# Patient Record
Sex: MALE | Race: WHITE
[De-identification: names, ages, dates, MRNs, and addresses within clinical notes are randomized per-mention and may not be internally consistent; named-entity substitution may affect disease eponyms.]

---

## 2018-09-07 ENCOUNTER — HOSPITAL ENCOUNTER (EMERGENCY)
Dept: HOSPITAL 74 - JERFT | Age: 58
Discharge: HOME | End: 2018-09-07
Payer: COMMERCIAL

## 2018-09-07 VITALS — SYSTOLIC BLOOD PRESSURE: 154 MMHG | TEMPERATURE: 98.6 F | DIASTOLIC BLOOD PRESSURE: 98 MMHG | HEART RATE: 89 BPM

## 2018-09-07 VITALS — BODY MASS INDEX: 34 KG/M2

## 2018-09-07 DIAGNOSIS — K21.9: ICD-10-CM

## 2018-09-07 DIAGNOSIS — M79.642: Primary | ICD-10-CM

## 2018-09-07 DIAGNOSIS — S69.82XA: ICD-10-CM

## 2018-09-07 DIAGNOSIS — N40.0: ICD-10-CM

## 2018-09-07 PROCEDURE — 3E0233Z INTRODUCTION OF ANTI-INFLAMMATORY INTO MUSCLE, PERCUTANEOUS APPROACH: ICD-10-PCS

## 2018-09-07 NOTE — PDOC
Rapid Medical Evaluation


Chief Complaint: Pain, Acute


Time Seen by Provider: 09/07/18 18:23


Medical Evaluation: 


 Allergies











Allergy/AdvReac Type Severity Reaction Status Date / Time


 


No Known Allergies Allergy   Verified 08/14/14 18:58











09/07/18 18:26


58 year old male with left index finger pain unsure of injury. limited rom


PE: patient alert ox3. pain to DIP joint





A; finger pain





p" xray





patient to the ER for further management of care. 





**Discharge Disposition





- Diagnosis


 Finger pain, left








- Referrals





- Patient Instructions





- Post Discharge Activity

## 2018-09-07 NOTE — PDOC
History of Present Illness





- General


Chief Complaint: Pain, Acute


Stated Complaint: HAND PAIN


Time Seen by Provider: 09/07/18 18:23


History Source: Patient





- History of Present Illness


Occurred: reports: other


Upper Extremity Pain Location: left: 2nd finger, hand





Past History





- Past Medical History


Allergies/Adverse Reactions: 


 Allergies











Allergy/AdvReac Type Severity Reaction Status Date / Time


 


No Known Allergies Allergy   Verified 09/07/18 18:25











Home Medications: 


Ambulatory Orders





Oxycodone HCl/Acetaminophen [Percocet 5-325 mg Tablet -] 1 - 2 tab PO Q6H #20 

tablet 07/21/14 


Clindamycin [Cleocin -] 300 mg PO TID #30 capsule 08/14/14 


Ibuprofen [Motrin] 800 mg PO TID #20 tablet 08/14/14 


Methylprednisolone [Medrol Dose Curtis] 4 mg PO ASDIR #21 tablet 08/14/14 


Oxycodone HCl/Acetaminophen [Percocet 5-325 mg Tablet] 1 - 2 tab PO Q6H #20 

tablet 08/14/14 








COPD: No


DVT: No


Dementia: No


Other medical history: GERD & BPH





- Immunization History


Immunization Up to Date: No





- Suicide/Smoking/Psychosocial Hx


Smoking History: Never smoked


Have you smoked in the past 12 months: No


Information on smoking cessation initiated: No


Hx Alcohol Use: No


Drug/Substance Use Hx: No


Substance Use Type: None





**Review of Systems





- Review of Systems


Constitutional: No: Chills, Fever


Musculoskeletal: Yes: Joint Pain.  No: Joint Swelling





*Physical Exam





- Vital Signs


 Last Vital Signs











Temp Pulse Resp BP Pulse Ox


 


 98.6 F   89   16   154/98   99 


 


 09/07/18 18:25  09/07/18 18:25  09/07/18 18:25  09/07/18 18:25  09/07/18 18:25














- Physical Exam


General Appearance: Yes: Appropriately Dressed.  No: Apparent Distress


HEENT: positive: Normal Voice


Neck: positive: Supple


Respiratory/Chest: negative: Respiratory Distress


Extremity: positive: Tender (to PIP and MCP J of L 2nd digit without swelling, 

deformity or skin changes, FROMI, able to make fist, NVI)


Integumentary: positive: Dry, Warm


Neurologic: positive: Fully Oriented, Alert, Normal Mood/Affect





Medical Decision Making





- Medical Decision Making





09/07/18 18:55


58-year-old male, history of GERD, BPH, here with left hand pain.  Patient 

states for the past 2 days has had severe pain to left 2nd finger diffusely.  

Taking Tylenol with no relief.  Denies any trauma or other obvious inciting 

factors.  Patient is left-handed and does heavy lifting at work.  Patient 

states he also has intermittent pain to left hand when he is pushing himself up 

from a sitting position.  Also states for the past several years, he has had 

intermittent numbness to left hand that usually goes away when he shakes hand 

per patient. No wrist pain or tingling





See exam





Possible arthritis vs overuse injury to L hand


No trauma


No e/o gout or infection at this time


Hx not c/w carpel tunnel


-pain control in ED


-dc w/ motrin prn and pmd f/u for further evaluation


09/07/18 19:00








*DC/Admit/Observation/Transfer


Diagnosis at time of Disposition: 


 Hand pain, left








- Discharge Dispostion


Disposition: HOME


Condition at time of disposition: Good





- Referrals





- Patient Instructions


Printed Discharge Instructions:  DI for Hand Pain


Additional Instructions: 


The cause of your left hand pain is unclear at this time.  


 Possibilities include arthritis or overuse injury as discussed in ED.


Take Motrin as needed for pain


Follow-up with your doctor for further evaluation and possible referral to a 

rheumatologist





- Post Discharge Activity

## 2020-10-16 ENCOUNTER — HOSPITAL ENCOUNTER (EMERGENCY)
Dept: HOSPITAL 74 - JERFT | Age: 60
Discharge: HOME | End: 2020-10-16
Payer: COMMERCIAL

## 2020-10-16 VITALS — TEMPERATURE: 98.5 F | DIASTOLIC BLOOD PRESSURE: 87 MMHG | HEART RATE: 79 BPM | SYSTOLIC BLOOD PRESSURE: 162 MMHG

## 2020-10-16 VITALS — BODY MASS INDEX: 34 KG/M2

## 2020-10-16 DIAGNOSIS — M54.2: Primary | ICD-10-CM

## 2020-10-16 LAB
ALBUMIN SERPL-MCNC: 3.8 G/DL (ref 3.4–5)
ALP SERPL-CCNC: 73 U/L (ref 45–117)
ALT SERPL-CCNC: 39 U/L (ref 13–61)
ANION GAP SERPL CALC-SCNC: 6 MMOL/L (ref 8–16)
AST SERPL-CCNC: 30 U/L (ref 15–37)
BASOPHILS # BLD: 0.5 % (ref 0–2)
BILIRUB SERPL-MCNC: 0.4 MG/DL (ref 0.2–1)
BUN SERPL-MCNC: 22.1 MG/DL (ref 7–18)
CALCIUM SERPL-MCNC: 9.1 MG/DL (ref 8.5–10.1)
CHLORIDE SERPL-SCNC: 110 MMOL/L (ref 98–107)
CO2 SERPL-SCNC: 24 MMOL/L (ref 21–32)
CREAT SERPL-MCNC: 0.9 MG/DL (ref 0.55–1.3)
DEPRECATED RDW RBC AUTO: 13 % (ref 11.9–15.9)
EOSINOPHIL # BLD: 2 % (ref 0–4.5)
GLUCOSE SERPL-MCNC: 112 MG/DL (ref 74–106)
HCT VFR BLD CALC: 41.4 % (ref 35.4–49)
HGB BLD-MCNC: 14.3 GM/DL (ref 11.7–16.9)
LYMPHOCYTES # BLD: 28.2 % (ref 8–40)
MCH RBC QN AUTO: 30.9 PG (ref 25.7–33.7)
MCHC RBC AUTO-ENTMCNC: 34.5 G/DL (ref 32–35.9)
MCV RBC: 89.5 FL (ref 80–96)
MONOCYTES # BLD AUTO: 10.3 % (ref 3.8–10.2)
NEUTROPHILS # BLD: 59 % (ref 42.8–82.8)
PLATELET # BLD AUTO: 173 K/MM3 (ref 134–434)
PMV BLD: 8.7 FL (ref 7.5–11.1)
POTASSIUM SERPLBLD-SCNC: 4.1 MMOL/L (ref 3.5–5.1)
PROT SERPL-MCNC: 7.5 G/DL (ref 6.4–8.2)
RBC # BLD AUTO: 4.62 M/MM3 (ref 4–5.6)
SODIUM SERPL-SCNC: 140 MMOL/L (ref 136–145)
WBC # BLD AUTO: 5.5 K/MM3 (ref 4–10)

## 2020-10-16 PROCEDURE — C9803 HOPD COVID-19 SPEC COLLECT: HCPCS

## 2020-10-16 PROCEDURE — 3E0233Z INTRODUCTION OF ANTI-INFLAMMATORY INTO MUSCLE, PERCUTANEOUS APPROACH: ICD-10-PCS

## 2020-10-16 PROCEDURE — U0003 INFECTIOUS AGENT DETECTION BY NUCLEIC ACID (DNA OR RNA); SEVERE ACUTE RESPIRATORY SYNDROME CORONAVIRUS 2 (SARS-COV-2) (CORONAVIRUS DISEASE [COVID-19]), AMPLIFIED PROBE TECHNIQUE, MAKING USE OF HIGH THROUGHPUT TECHNOLOGIES AS DESCRIBED BY CMS-2020-01-R: HCPCS

## 2020-10-16 NOTE — XMS
Summarization Of Episode

                           Created on:2020



Patient:WILMER GOSS

Sex:Male

:1960

External Reference #:[id number]





Demographics







                          Address                   [address]



                                                    [address]



                                                    [place] [address]

 

                          Home Phone                [phone]

 

                          Work Phone                [phone]

 

                          Preferred Language        English

 

                          Marital Status             or 

 

                          Islam Affiliation     [place]

 

                          Race                      [facility]

 

                          Ethnic Group               or 









Author







                          Organization              [facility]









Support







                Name            Relationship    Address         Phone

 

                TYE Fernandez     [address] [phone]



                                                [place] [address]    

 

                PIERCE           PARTNER         [address]    [phone]



                                                [place] [address] 









Re-disclosure Warning

The records that you are about to access may contain information from federally-
assisted alcohol or drug abuse programs. If such information is present, then 
the following federally mandated warning applies: This information has been 
disclosed to you from records protected by federal confidentiality rules (42 CFR
part 2). The federal rules prohibit you from making any further disclosure of 
this information unless further disclosure is expressly permitted by the written
consent of the person to whom it pertains or as otherwise permitted by 42 CFR 
part 2. A general authorization for the release of medical or other information 
is NOT sufficient for this purpose. The Federal rules restrict any use of the 
information to criminally investigate or prosecute any alcohol or drug abuse 
patient.The records that you are about to access may contain highly sensitive 
health information, the redisclosure of which is protected by Article 27-F of 
the [facility] Public Health law. If you continue you may haveaccess to 
information: Regarding HIV / AIDS; Provided by facilities licensed or operated 
by the [facility] Office of Mental Health; or Provided by the [facility]
Office for People With Developmental Disabilities. If such information is 
present, then the following [place] mandated warning applies: This 
information has been disclosed to you from confidential records which are 
protected by state law. State law prohibits you from making any further 
disclosure of this information without the specific written consent of the 
person to whom it pertains, or as otherwise permitted by law. Any unauthorized 
further disclosure in violation of state law may result in a fine or retirement 
sentence or both. A general authorization for the release of medical or other 
information is NOT sufficient authorization for further disclosure.



Family History







           Family Member Family Member Family Member Date of    Description Data

 Source(s)



           Name       Gender     Status     Status                

 

           Unknown    Male       Diagnosis  2011            [facility]



                                            12:00:00 AM            Jeffy Houser

al



                                            [facility]T                   [facility])







Insurance Providers







          Payer name Policy type Policy ID Covered   Covered party's Policy    P

albaro



                    / Coverage           party ID  relationship to Anthony    Inf

ormation



                    type                          anthony              

 

          UNHC MEDICAID           [id number]           SP                  [id number]

[id number]



          COMM PLAN                                                   

 

          MVP Medicaid           [id number]           S                   [id number]

01



          Managed Care                                                   

 

          Dental              [id number]           S                   [id number]



          [facility]D                                                   

 

          [facility]            [id number]           S                   [id number]



          [employer] MKD                                                   

 

          [facility]           [id number]           S                   [id number]

1



          FFS Medicaid                                                   

 

          Medicaid [id number]           [id number]            S                   [id number]

[id number]



          Wrap Claims                                                   

 

          [facility]           [id number]           S                   [id number]

1



          Options MKD                                                   

 

          (DON'T USE)           [id number]           S                   [id number]

1



          Dental                                                      



          [facility]                                                   



          Non-Par                                                     



          Medical Manage                                                   



          [address]                                                     

 

          (DO NOT USE)           [id number]           S                   [id number]

01



          [facility]                                                   



          Auth PCP Not                                                   



          [facility]                                                   







Results







                    ID                  Date                Data Source

 

                    [id number]           2020 12:00:00 AM EDT [facility]











          Name      Value     Range     Interpretation Code Description Data Yue

rce(s) Supporting



                                                                      Document(s

)

 

          2019-nCoV                                         [facility]    



          RNA XXX                                                     



          QUEENIE+probe-                                                   



          Imp                                                         









                                        This lab was ordered by [facility] and reported by [employer]











                                        Procedure

## 2020-10-16 NOTE — PDOC
History of Present Illness





- General


Chief Complaint: Pain


Stated Complaint: NECK PAIN


Time Seen by Provider: 10/16/20 12:37


History Source: Patient


Exam Limitations: No Limitations





- History of Present Illness


Initial Comments: 





10/16/20 13:15


60-year-old male history of gastritis, BPH presents complaining of left-sided 

neck pain after heavy lifting at work 1 week ago.  Yesterday reports feeling 

right-sided throat pain, left-sided temporal discomfort, "I just don't feel 

right". Denies changes in vision, fever, chills, rash, headache, vomiting, 

Recent travel, known sick contacts, abdominal pain, chest pain, shortness of 

breath, weakness, numbness, tingling or any other complaints.  Patient has been 

taking acetaminophen daily with some relief.  States today neck pain has 

improved greatly and sore throat is resolved. 





ROS: as above





PE:


GENERAL: well-appearing, NAD


HEAD: NCAT


EYES: EOMI, Pupils equal, round and reactive to light, sclera anicteric, 

conjunctiva clear


ENT: Normal bilateral ear canals, pharynx: no erythema, no exudate, uvula 

midline


NECK: supple, no lymphadenopathy


CHEST: nontender


RESP: clear, no w/r/r


CARDIO: rrr, no m/g/r


ABD: +BS, soft, nontender, non distended


BACK: no midline spinal ttp, no CVAT 


EXTREMITIES: Normal range of motion, no edema


NEUROLOGICAL: Normal speech, normal gait


SKIN: No rashes noted, warm, Dry


Is this a multiple visit Asthma Patient?: No





Past History





- Medical History


Allergies/Adverse Reactions: 


                                    Allergies











Allergy/AdvReac Type Severity Reaction Status Date / Time


 


No Known Allergies Allergy   Verified 10/16/20 12:10











Home Medications: 


Ambulatory Orders





Oxycodone HCl/Acetaminophen [Percocet 5-325 mg Tablet -] 1 - 2 tab PO Q6H #20 

tablet 07/21/14 


Clindamycin [Cleocin -] 300 mg PO TID #30 capsule 08/14/14 


Ibuprofen [Motrin] 800 mg PO TID #20 tablet 08/14/14 


Methylprednisolone [Medrol Dose Curtis] 4 mg PO ASDIR #21 tablet 08/14/14 


Oxycodone HCl/Acetaminophen [Percocet 5-325 mg Tablet] 1 - 2 tab PO Q6H #20 

tablet 08/14/14 








COPD: No


DVT: No


Dementia: No





- Immunization History


Immunization Up to Date: No





- Psycho-Social/Smoking History


Smoking History: Never smoked


Have you smoked in the past 12 months: No





- Substance Abuse Hx (Audit-C & DAST Scrn)


How often the patient has a drink containing alcohol: Never


Score: In Men: 4 or > Positive; In Women: 3 or > Positive: 0


Screen Result (Pos requires Nsg. Audit-10AR): Negative


In the last yr the pt used illegal drug/Rx for NonMed reason: No


Score:  Yes response is considered Positive: 0


Screen Result (Positive result requires Nsg. DAST-10): Negative





*Physical Exam





- Vital Signs


                                Last Vital Signs











Temp Pulse Resp BP Pulse Ox


 


 98.5 F   79   18   162/87   97 


 


 10/16/20 12:11  10/16/20 12:11  10/16/20 12:11  10/16/20 12:11  10/16/20 12:11














ED Treatment Course





- LABORATORY


CBC & Chemistry Diagram: 


                                 10/16/20 13:05





                                 10/16/20 13:05





Medical Decision Making





- Medical Decision Making





10/16/20 13:33


60-year-old male history of gastritis, BPH presents complaining of left-sided 

neck pain after heavy lifting at work 1 week ago.  Yesterday reports feeling 

right-sided throat pain, left-sided temporal discomfort, "I just don't feel 

right". Denies changes in vision, fever, chills, rash, headache, vomiting, 

Recent travel, known sick contacts, abdominal pain, chest pain, shortness of 

breath, weakness, numbness, tingling or any other complaints.  Patient has been 

taking acetaminophen daily with some relief.  States today neck pain has 

improved greatly and sore throat is resolved. 








cbc, cmp


toradol 30 mg IM


covid swab


re assess





10/16/20 14:24


reviewed labs


advised patient to remain hydrated


note for work provided


return precautions discussed


will f/u with pmd





Discharge





- Discharge Information


Problems reviewed: Yes


Clinical Impression/Diagnosis: 


 Neck pain





Condition: Stable


Disposition: HOME





- Admission


No





- Follow up/Referral


Referrals: 


ON STAFF,NOT [Primary Care Provider] - 





- Patient Discharge Instructions


Additional Instructions: 


Remain hydrated, alternate between acetaminophen 650 and ibuprofen 600 mg every 

6 hours as needed


Follow-up with your primary care doctor within 1 week


If symptoms worsen return to ED





- Post Discharge Activity


Work/Back to School Note:  Back to Work

## 2022-01-03 ENCOUNTER — HOSPITAL ENCOUNTER (EMERGENCY)
Dept: HOSPITAL 74 - JER | Age: 62
Discharge: HOME | End: 2022-01-03
Payer: COMMERCIAL

## 2022-01-03 VITALS — DIASTOLIC BLOOD PRESSURE: 92 MMHG | SYSTOLIC BLOOD PRESSURE: 142 MMHG | HEART RATE: 100 BPM | TEMPERATURE: 98.2 F

## 2022-01-03 VITALS — BODY MASS INDEX: 41.3 KG/M2

## 2022-01-03 DIAGNOSIS — R07.9: Primary | ICD-10-CM

## 2022-01-03 LAB
ALBUMIN SERPL-MCNC: 3.8 G/DL (ref 3.4–5)
ALP SERPL-CCNC: 86 U/L (ref 45–117)
ALT SERPL-CCNC: 37 U/L (ref 13–61)
ANION GAP SERPL CALC-SCNC: 5 MMOL/L (ref 8–16)
APTT BLD: 31.6 SECONDS (ref 25.2–36.5)
AST SERPL-CCNC: 28 U/L (ref 15–37)
BASOPHILS # BLD: 0.3 % (ref 0–2)
BILIRUB SERPL-MCNC: 0.2 MG/DL (ref 0.2–1)
BUN SERPL-MCNC: 22.2 MG/DL (ref 7–18)
CALCIUM SERPL-MCNC: 9.2 MG/DL (ref 8.5–10.1)
CHLORIDE SERPL-SCNC: 108 MMOL/L (ref 98–107)
CO2 SERPL-SCNC: 29 MMOL/L (ref 21–32)
CREAT SERPL-MCNC: 0.9 MG/DL (ref 0.55–1.3)
DEPRECATED RDW RBC AUTO: 12.9 % (ref 11.9–15.9)
EOSINOPHIL # BLD: 2.2 % (ref 0–4.5)
GLUCOSE SERPL-MCNC: 103 MG/DL (ref 74–106)
HCT VFR BLD CALC: 43.4 % (ref 35.4–49)
HGB BLD-MCNC: 14.7 GM/DL (ref 11.7–16.9)
INR BLD: 1.04 (ref 0.83–1.09)
LYMPHOCYTES # BLD: 31.5 % (ref 8–40)
MCH RBC QN AUTO: 30.4 PG (ref 25.7–33.7)
MCHC RBC AUTO-ENTMCNC: 33.8 G/DL (ref 32–35.9)
MCV RBC: 90.1 FL (ref 80–96)
MONOCYTES # BLD AUTO: 14.9 % (ref 3.8–10.2)
NEUTROPHILS # BLD: 51.1 % (ref 42.8–82.8)
PLATELET # BLD AUTO: 181 10^3/UL (ref 134–434)
PMV BLD: 9 FL (ref 7.5–11.1)
PROT SERPL-MCNC: 7.7 G/DL (ref 6.4–8.2)
PT PNL PPP: 12.2 SEC (ref 9.7–13)
RBC # BLD AUTO: 4.81 M/MM3 (ref 4–5.6)
SODIUM SERPL-SCNC: 142 MMOL/L (ref 136–145)
WBC # BLD AUTO: 5.2 K/MM3 (ref 4–10)

## 2022-01-03 PROCEDURE — C9803 HOPD COVID-19 SPEC COLLECT: HCPCS

## 2022-01-03 PROCEDURE — U0005 INFEC AGEN DETEC AMPLI PROBE: HCPCS

## 2022-01-03 PROCEDURE — U0003 INFECTIOUS AGENT DETECTION BY NUCLEIC ACID (DNA OR RNA); SEVERE ACUTE RESPIRATORY SYNDROME CORONAVIRUS 2 (SARS-COV-2) (CORONAVIRUS DISEASE [COVID-19]), AMPLIFIED PROBE TECHNIQUE, MAKING USE OF HIGH THROUGHPUT TECHNOLOGIES AS DESCRIBED BY CMS-2020-01-R: HCPCS
